# Patient Record
Sex: MALE | Race: WHITE | URBAN - METROPOLITAN AREA
[De-identification: names, ages, dates, MRNs, and addresses within clinical notes are randomized per-mention and may not be internally consistent; named-entity substitution may affect disease eponyms.]

---

## 2019-09-04 ENCOUNTER — TELEPHONE (OUTPATIENT)
Dept: NEUROLOGY | Age: 59
End: 2019-09-04

## 2019-10-16 NOTE — PROGRESS NOTES
Neurology Note    Patient ID:  Ziyad Bhardwaj  <[id number]>  65 y.o.  1960      Date of Consultation:  October 17, 2019    Subjective: I have ALS       History of Present Illness:   Ziyad Bhardwaj is a 62 y.o. male  with ALS who returns to clinic today. He was last seen in clinic 3 months ago when I was seeing patient at the [address] [facility] clinic at [facility]. Since that last visit he has noticed that there has been continued progression in his muscle weakness. He does find that everything has become much more difficult for him. He has not had any fall since he was last here but he is much more careful. He does use his rolling walker essentially all of that time in the house and he does have a motorized wheelchair which she uses outside of the house. This was a loner for him. He also does now have a van to help with transportation. This has been a very positive thing for him. He was able to then travel to [facility] as well as to the [facility]. His own motorized wheelchair is going through authorization process and should be ready for him soon. He does continue to get the edaravone infusions. He did have a clot in his port recently and did go to the now building and had the port flushed. He does feel that his swallowing is going pretty well. He did choke on a piece of rice recently but for the most part he feels that he is eating well. He continues to use the trilogy without any difficulty and uses it most nights of the week. Transfers are going pretty well however he is needing more assistance of his wife to help with that. He continues to work full-time and is doing well from a work standpoint. He does not feel too fatigued or tired with this. He has not noticed any excessive saliva. He is not noticing the cramping pain that he was previously. Past Medical History:   Diagnosis Date    Fatigue     Leg swelling     Muscle weakness     Neuropathy         History reviewed.  No pertinent surgical history. Family History   Problem Relation Age of Onset    Cancer Mother     Cancer Father         Social History     Tobacco Use    Smoking status: Never Smoker    Smokeless tobacco: Never Used   Substance Use Topics    Alcohol use: Not on file        Not on File     Prior to Admission medications    Medication Sig Start Date End Date Taking? Authorizing Provider   RILUZOLE PO Take  by mouth two (2) times a day. Yes Provider, Historical   edaravone (RADICAVA IV) by IntraVENous route. Yes Provider, Historical   cholecalciferol, vitamin D3, (VITAMIN D3 PO) Take  by mouth. Yes Provider, Historical   cyanocobalamin, vitamin B-12, (VITAMIN B-12 PO) Take  by mouth. Yes Provider, Historical   MAGNESIUM PO Take  by mouth. Yes Provider, Historical       Review of Systems:    General, constitutional: negative  Eyes, vision: negative  Ears, nose, throat: negative  Cardiovascular, heart: negative  Respiratory: negative  Gastrointestinal: negative  Genitourinary: negative  Musculoskeletal: negative  Skin and integumentary: negative  Psychiatric: negative  Endocrine: negative  Neurological: negative, except for HPI  Hematologic/lymphatic: negative  Allergy/immunology: negative     Objective:     Visit Vitals  /74   Pulse 84   SpO2 94%       Physical Exam:      General:  appears well nourished in no acute distress  Neck: no carotid bruits  Lungs: clear to auscultation  Heart:  no murmurs, regular rate  Lower extremity: peripheral pulses palpable and no edema  Skin: intact    Neurological exam:    Awake, alert, oriented to person, place and time  Recent and remote memory were normal  Attention and concentration were intact  Language was intact.   There was no aphasia  Speech: no dysarthria  Fund of knowledge was preserved    Cranial nerves:   II-XII were tested    Perrrla  Fundoscopic examination revealed venous pulsations and no clear abnormalities  Visual fields were full  Eomi, no evidence of nystagmus  Facial sensation:  normal and symmetric  Facial motor: normal and symmetric  Hearing intact  SCM strength intact  Tongue: midline without fasciculations    Motor: Tone normal    No evidence of fasciculations    Strength testing:   deltoid triceps biceps Wrist ext. Wrist flex. intrinsics Hip flex. Hip ext. Knee ext. Knee flex Dorsi flex Plantar flex   Right 4 4 4 1 1 1 4 4 4 4 2 3   Left 4 4 4 1 1 1 4 4 4 4 2 3         Sensory:  Upper extremity: intact to pp, light touch, and vibration > 10 seconds  Lower extremity: intact to pp, light touch, and vibration > 10 seconds    Reflexes:    Right Left  Biceps  2 2  Triceps 2 2  Brachiorad. 2 2  Patella  2 2  Achilles 2 2    Plantar response:  flexor bilaterally      Cerebellar testing:  no tremor apparent, finger/nose and romelia were intact    Romberg: absent    Gait: steady. Heel, toe, and tandem gait were normal    Labs:     No results found for: HBA1C, NA, K, CL, GLU, BUN, CREA, CA, WBC, HCT, HGB, PLT, LDL, [id number], HCTEXT, HGBEXT, PLTEXT, XBF1TMCQ, HCTEXT, HGBEXT, PLTEXT    Imaging:    No results found for this or any previous visit. No results found for this or any previous visit.     ALS Functional Rating Scale Revised (ALS-FRS-R)    SPEECH 3 =  Detectable speech disturbance   SALIVATION 4 =  Normal   SWALLOWING 3 =  Early eating problems-occasional choking   HANDWRITING 2 =  Not all words are legible   CUTTING FOOD AND HANDLING UTENSILS   Patients without gastrostomy > Use 5b if > 50% is through g-tube 1 =  Food must be cut by someone, but can still feed slowly   CUTTING FOOD AND HANDLING UTENSILS   Patients with gastrostomy >5b option is used if the patient has a gastrostomy and only if it is the primary method (more than 50%) of eating    DRESSING AND HYGIENE 2 =  Intermittent assistance or substitute methods   TURNING IN BED AND ADJUSTING BED CLOTHES 2 =  Can turn alone or adjust sheets, but with great difficulty   WALKING 2 =  Walks with assistance   CLIMBING STAIRS 1 =  Needs assistance   DYSPNEA 3 =  Occurs when walking   ORTHOPNEA 2 =  Needs extra pillows in order to sleep (more than two)   RESPIRATORY INSUFFICIENCY 2 =  Continuous use of BiPAP during the night   TOTAL 27           Assessment and Plan:   The patient is a pleasant 70-year-old gentleman with progressive ALS. His neurological examination shows continued progression of the disease process    ALS:  We discussed the multifaceted nature of the disease and all the different components of treatment as listed below. Therapeutics:  Continue edaravone. We reviewed his ALS functional rating scale and he continues to be approximately one-point per month decrease. Given he continues at this rate, I would continue with the current infusions. Continue rilutek    Dietary: We talked at length about continuing progression of the disease. Given that his vital capacity continues to drop some, we had discussions at length today about supplemental feeding tubes. We talked about what literature is noted in regards to supplemental feeding tubes. At this time, he is not interested in that. We did talk about safety with swallowing as well as ensuring that he minimizes the risk of aspiration. There are certain foods that he will attempt to avoid now. We also talked about the importance of hydration. This will help to minimize constipation as well as with some of the dry skin he is having. He will use moisturizing cream on that. Anxiety:  He will continue with the low-dose alprazolam at bedtime which is helping him significantly. Cramps:  He will continue with the magnesium supplementation which seems to be working well. Respiratory:    He will continue to use his trilogy at night. The BiPAP is fitting well    Mobility and therapy:  I discussed at length about safety with transfers. They do have additional equipment if necessary.   I also talked to him about the importance of range of motion and trying to increase his activity as much as possible. Daily work:  He currently is able to continue at work. We talked at length about how does short-term and long-term disability work and how does the process of Social Security disability happened. All of the questions that they had in regards to this were answered fully. At this time, he feels that he is doing well at work and wants to continue to work as long as possible. Caregiver support: We talked about ensuring that his wife has enough support at the house and that she does have a social network which has been positive for her. The patient should return to clinic in 3 months. Renewed medication: none needed today    I spent 50    minutes with the patient  with over 50 % of the time counseling and coordinating the care plan in regards to the diagnosis, diagnostic testing, and treatment plan. The patient and his wife had the ability to ask questions and all questions were answered.          Signed By:  Pj Ventura DO FAAN    October 17, 2019

## 2019-10-17 ENCOUNTER — OFFICE VISIT (OUTPATIENT)
Dept: NEUROLOGY | Age: 59
End: 2019-10-17

## 2019-10-17 VITALS — HEART RATE: 84 BPM | SYSTOLIC BLOOD PRESSURE: 124 MMHG | OXYGEN SATURATION: 94 % | DIASTOLIC BLOOD PRESSURE: 74 MMHG

## 2019-10-17 DIAGNOSIS — G12.21 ALS (AMYOTROPHIC LATERAL SCLEROSIS) (HCC): Primary | ICD-10-CM

## 2019-10-17 NOTE — PATIENT INSTRUCTIONS
A Healthy Lifestyle: Care Instructions  Your Care Instructions    A healthy lifestyle can help you feel good, stay at a healthy weight, and have plenty of energy for both work and play. A healthy lifestyle is something you can share with your whole family. A healthy lifestyle also can lower your risk for serious health problems, such as high blood pressure, heart disease, and diabetes. You can follow a few steps listed below to improve your health and the health of your family. Follow-up care is a key part of your treatment and safety. Be sure to make and go to all appointments, and call your doctor if you are having problems. It's also a good idea to know your test results and keep a list of the medicines you take. How can you care for yourself at home? · Do not eat too much sugar, fat, or fast foods. You can still have dessert and treats now and then. The goal is moderation. · Start small to improve your eating habits. Pay attention to portion sizes, drink less juice and soda pop, and eat more fruits and vegetables. ? Eat a healthy amount of food. A 3-ounce serving of meat, for example, is about the size of a deck of cards. Fill the rest of your plate with vegetables and whole grains. ? Limit the amount of soda and sports drinks you have every day. Drink more water when you are thirsty. ? Eat at least 5 servings of fruits and vegetables every day. It may seem like a lot, but it is not hard to reach this goal. A serving or helping is 1 piece of fruit, 1 cup of vegetables, or 2 cups of leafy, raw vegetables. Have an apple or some carrot sticks as an afternoon snack instead of a candy bar. Try to have fruits and/or vegetables at every meal.  · Make exercise part of your daily routine. You may want to start with simple activities, such as walking, bicycling, or slow swimming. Try to be active 30 to 60 minutes every day. You do not need to do all 30 to 60 minutes all at once.  For example, you can exercise 3 times a day for 10 or 20 minutes. Moderate exercise is safe for most people, but it is always a good idea to talk to your doctor before starting an exercise program.  · Keep moving. [place] the lawn, work in the garden, or [employer]. Take the stairs instead of the elevator at work. · If you smoke, quit. People who smoke have an increased risk for heart attack, stroke, cancer, and other lung illnesses. Quitting is hard, but there are ways to boost your chance of quitting tobacco for good. ? Use nicotine gum, patches, or lozenges. ? Ask your doctor about stop-smoking programs and medicines. ? Keep trying. In addition to reducing your risk of diseases in the future, you will notice some benefits soon after you stop using tobacco. If you have shortness of breath or asthma symptoms, they will likely get better within a few weeks after you quit. · Limit how much alcohol you drink. Moderate amounts of alcohol (up to 2 drinks a day for men, 1 drink a day for women) are okay. But drinking too much can lead to liver problems, high blood pressure, and other health problems. Family health  If you have a family, there are many things you can do together to improve your health. · Eat meals together as a family as often as possible. · Eat healthy foods. This includes fruits, vegetables, lean meats and dairy, and whole grains. · Include your family in your fitness plan. Most people think of activities such as jogging or tennis as the way to fitness, but there are many ways you and your family can be more active. Anything that makes you breathe hard and gets your heart pumping is exercise. Here are some tips:  ? Walk to do errands or to take your child to school or the bus.  ? Go for a family bike ride after dinner instead of watching TV. Where can you learn more? Go to http://alexsandra-jeff.info/. Enter M374 in the search box to learn more about \"A Healthy Lifestyle: Care Instructions. \"  Current as of: May 28, 2019  Content Version: 12.2  © 7940-8982 [employer]. Care instructions adapted under license by [employer] (which disclaims liability or warranty for this information). If you have questions about a medical condition or this instruction, always ask your healthcare professional. [address] any warranty or liability for your use of this information.

## 2020-01-15 NOTE — PROGRESS NOTES
Neurology Note    Patient ID:  Sondra Salinas  <[id number]>  39 y.o.  1960      Date of Consultation:  January 16, 2020    Subjective: I have ALS       History of Present Illness:   Sondra Salinas is a 61 y.o. male  with ALS who returns to clinic today. He was last seen in clinic 3 months ago when I was seeing patient at the [address] [facility] at [facility]. Since that last visit he has noticed that there has been continued progression in his muscle weakness. He does find that everything has become much more difficult for him. He has not had any fall since he was last here but he did have one stumble. He does use his rolling walker only in the house and  Has a motorized wheelchair which she uses both inside and outside of the house. His new chair did arrive since his last visit. He does continue to get the edaravone infusions. He did have a clot in his port recently and did go to the now building and had the port flushed. He does feel that his swallowing is going pretty well. He has not had any choking episodes, but occasionally has difficulty with thin liquids. Transfers are going pretty well however he is needing more assistance of his wife to help with that. Harder to get on and off of the toilet. He continues to work full-time and is doing well from a work standpoint. He does not feel too fatigued or tired with this. He has not noticed any excessive saliva. Needing extra flushes of port. Writing is worse      Did get diagnosed with diabetes. I spoke with his pcp and he has been started on a medication. VC was 59% when recently checked by his pcp. Using trilogy every night. Past Medical History:   Diagnosis Date    [facility] lateral [facility]     Fatigue     Leg swelling     Muscle weakness     Neuropathy         History reviewed. No pertinent surgical history.      Family History   Problem Relation Age of Onset    Cancer Mother     Cancer Father         Social History     Tobacco Use    Smoking status: Never Smoker    Smokeless tobacco: Never Used   Substance Use Topics    Alcohol use: Not on file        Not on File     Prior to Admission medications    Medication Sig Start Date End Date Taking? Authorizing Provider   metFORMIN (GLUCOPHAGE) 850 mg tablet Take  by mouth daily. Yes Provider, Historical   RILUZOLE PO Take  by mouth two (2) times a day. Yes Provider, Historical   edaravone (RADICAVA IV) by IntraVENous route. Yes Provider, Historical   cholecalciferol, vitamin D3, (VITAMIN D3 PO) Take  by mouth. Yes Provider, Historical   cyanocobalamin, vitamin B-12, (VITAMIN B-12 PO) Take  by mouth. Yes Provider, Historical   MAGNESIUM PO Take  by mouth. Yes Provider, Historical       Review of Systems:    General, constitutional: fatigue, weakness  Eyes, vision: negative  Ears, nose, throat: negative  Cardiovascular, heart: negative  Respiratory: negative  Gastrointestinal: negative  Genitourinary: negative  Musculoskeletal: negative  Skin and integumentary: negative  Psychiatric: negative  Endocrine: negative  Neurological: negative, except for HPI  Hematologic/lymphatic: negative  Allergy/immunology: negative     Objective:     Visit Vitals  /80   Pulse (P) 79   Ht 5' 9\" (1.753 m)   SpO2 (P) 97%       Physical Exam:    General:  appears well nourished in no acute distress  Neck: no carotid bruits  Lungs: clear to auscultation  Heart:  no murmurs, regular rate  Lower extremity: peripheral pulses palpable and trace edema  Skin: intact, mild discoloration in lower extremities. Neurological exam:    Awake, alert, oriented to person, place and time  Recent and remote memory were normal  Attention and concentration were intact  Language was intact.   There was no aphasia  Speech: no dysarthria  Fund of knowledge was preserved    Cranial nerves:   II-XII were tested    Perrrla  Visual fields were full  Eomi, no evidence of nystagmus  Facial sensation: normal and symmetric  Facial motor: normal and symmetric  Hearing intact  SCM strength intact  Tongue: midline without fasciculations    Motor: Tone normal    No evidence of fasciculations    Strength testing:   deltoid triceps biceps Wrist ext. Wrist flex. intrinsics Hip flex. Hip ext. Knee ext. Knee flex Dorsi flex Plantar flex   Right 4 3 4 1 1 1 4 4 4 3 2 3   Left 4 3 4 1 1 1 4 4 4 3 2 3         Sensory:  Upper extremity: intact to pp, light touch, and vibration > 10 seconds  Lower extremity: intact to pp, light touch, and vibration > 10 seconds    Reflexes:    Right Left  Biceps  2 2  Triceps 2 2  Brachiorad. 2 2  Patella  2 2  Achilles 2 2    Cerebellar testing:  no tremor apparent,    Romberg: not tested  Unable to ambulate  Labs:     No results found for: HBA1C, NA, K, CL, GLU, BUN, CREA, CA, WBC, HCT, HGB, PLT, LDL, [id number], HCTEXT, HGBEXT, PLTEXT, [id number], HCTEXT, HGBEXT, PLTEXT    Imaging:    No results found for this or any previous visit. No results found for this or any previous visit.     ALS Functional Rating Scale Revised (ALS-FRS-R)    SPEECH 3 =  Detectable speech disturbance   SALIVATION 4 =  Normal   SWALLOWING 3 =  Early eating problems-occasional choking   HANDWRITING 2 =  Not all words are legible   CUTTING FOOD AND HANDLING UTENSILS   Patients without gastrostomy > Use 5b if > 50% is through g-tube 1 =  Food must be cut by someone, but can still feed slowly   CUTTING FOOD AND HANDLING UTENSILS   Patients with gastrostomy >5b option is used if the patient has a gastrostomy and only if it is the primary method (more than 50%) of eating    DRESSING AND HYGIENE 2 =  Intermittent assistance or substitute methods   TURNING IN BED AND ADJUSTING BED CLOTHES 2 =  Can turn alone or adjust sheets, but with great difficulty   WALKING 2 =  Walks with assistance   CLIMBING STAIRS 0 =  Cannot do   DYSPNEA 3 =  Occurs when walking   ORTHOPNEA 2 =  Needs extra pillows in order to sleep (more than two) RESPIRATORY INSUFFICIENCY 2 =  Continuous use of BiPAP during the night   TOTAL 26           Assessment and Plan:   The patient is a pleasant 43-year-old gentleman with progressive ALS. His neurological examination shows continued progression of the disease process    ALS:  We discussed the multifaceted nature of the disease and all the different components of treatment as listed below. Therapeutics:  Continue edaravone. We reviewed his ALS functional rating scale and he continues to have a slow decline. He has only last one point in the past 3 months. This is improved from prior visits and still less than 1 pt per month. Given he continues at this rate, I would continue with the current infusions. Continue rilutek    Dietary: We talked at length about continuing progression of the disease. Given that his vital capacity continues to drop some, we had discussions at length today about supplemental feeding tubes. We talked about what literature is noted in regards to supplemental feeding tubes. At this time, he is not interested in that. We did talk about safety with swallowing as well as ensuring that he minimizes the risk of aspiration. There are certain foods that he will attempt to avoid now. We also talked about the importance of hydration. This will help to minimize constipation as well as with some of the dry skin he is having. He will use moisturizing cream on that. We also discussed his recent diagnosis of diabetes and the impact on his nutrition and ALS. Anxiety:  He will continue with the low-dose alprazolam at bedtime which is helping him significantly. Cramps:  He will continue with the magnesium supplementation which seems to be working well. Not as bothersome as prior. Respiratory:    He will continue to use his trilogy at night. The BiPAP is fitting well    Mobility and therapy:  I discussed at length about safety with transfers.   They do have additional equipment if necessary. I also talked to him about the importance of range of motion and trying to increase his activity as much as possible. His will look into a bedside commode    Daily work:  He currently is able to continue at work. We talked at length about how does short-term and long-term disability work and how does the process of Social Security disability happened. All of the questions that they had in regards to this were answered fully. At this time, he feels that he is doing well at work and wants to continue to work as long as possible. He will look into adaptive equipment to help with typing. Caregiver support: We talked about ensuring that his wife has enough support at the house and that she does have a social network which has been positive for her. The patient should return to clinic in 3 months. Renewed medication: none needed today    I spent 45    minutes with the patient  with over 50 % of the time counseling and coordinating the care plan in regards to the diagnosis, diagnostic testing, and treatment plan. The patient and his wife had the ability to ask questions and all questions were answered.          Signed By:  Zara Trevizo DO FAAN    January 16, 2020

## 2020-01-16 ENCOUNTER — OFFICE VISIT (OUTPATIENT)
Dept: NEUROLOGY | Age: 60
End: 2020-01-16

## 2020-01-16 VITALS — HEIGHT: 69 IN | SYSTOLIC BLOOD PRESSURE: 124 MMHG | DIASTOLIC BLOOD PRESSURE: 80 MMHG

## 2020-01-16 DIAGNOSIS — G12.21 ALS (AMYOTROPHIC LATERAL SCLEROSIS) (HCC): Primary | ICD-10-CM

## 2020-01-16 RX ORDER — METFORMIN HYDROCHLORIDE 850 MG/1
TABLET ORAL DAILY
COMMUNITY

## 2020-01-16 NOTE — PATIENT INSTRUCTIONS

## 2020-03-11 ENCOUNTER — TELEPHONE (OUTPATIENT)
Dept: NEUROLOGY | Age: 60
End: 2020-03-11

## 2020-03-11 NOTE — TELEPHONE ENCOUNTER
Klaudia Barbour,     The drug rep for this medication came into the office recently but because it was an infusion med, I did not gather any information from them. Dr. Lizett Bain mentioned it to me - as a good resource. Can you get me the contact information for this?

## 2020-03-11 NOTE — TELEPHONE ENCOUNTER
Email reply from Manuela/ELLIE:     Rm Colvin,   this drug may have to be done thorugh [facility] so, I need to dig into this not real familiar with this drug though I did help out [facility] contact for a while send over [address] going home for infusions. I will have to get back to you. There is also a ID number that the patient has for this drug from [employer] . Daljit Stroud      Per Ronny Corrigan, pt has had infusion earlier this week.

## 2020-03-11 NOTE — TELEPHONE ENCOUNTER
Re: Corrinne Harman, [address] [facility] ([employer])  Covered: Retail, Mail Order Unknown: Specialty, Long-Term Care               Member ID: [id number] 1960 - M     Group ID: [id number] [facility]      Group Name: [address] [place] [address] [address], [address] [facility]      [facility] ran test claim - verified same message I am getting through Cover My Meds - \"Available Without Authorization\". I called my contact with [employer] Rene Garg) and the drug is in limited distribution and she could see that [employer] can process it and test claim shows paid with [employer] too. Called Bettina trent/ [facility] - RN - left  for assistance. Emailed her and María Mccarthy to review how to process this home infusion order. [facility] showed there was a $100 co-pay (with them). Because he has commercial insurance, [facility] does offer patient assistance. I will also f/u w/ drug rep for assistance.

## 2020-03-12 ENCOUNTER — TELEPHONE (OUTPATIENT)
Dept: NEUROLOGY | Age: 60
End: 2020-03-12

## 2020-03-12 NOTE — TELEPHONE ENCOUNTER
[facility] [address] [place] [address] to check if a prior Raquel Palmer will be initiated by our office for the next infusion. I was told \"[employer]\" will always need a P. A. nd there was one on file from 1/10/20 - 3/10/20 and a second one was renewed w/ date range of 3/11/20 - 9/10/20. I was told they ([facility]) get the authorizations and they only get them for people who are doing the infusion in the home. Patient had the infusion on Mon, 3/9 in his home.

## 2020-03-12 NOTE — TELEPHONE ENCOUNTER
[employer] (for [employer]) called to f/u on the Benefit Verification form. I advised the pt had already completed the home infusion on Mon, 3/9. Gave her the auth dates so that she can update their information and at that time, we do not need to complete that form since [facility] initiated and processed the P.A. for it.